# Patient Record
Sex: MALE | Race: WHITE | Employment: STUDENT | URBAN - NONMETROPOLITAN AREA
[De-identification: names, ages, dates, MRNs, and addresses within clinical notes are randomized per-mention and may not be internally consistent; named-entity substitution may affect disease eponyms.]

---
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
## 2023-08-03 LAB
ESTIMATED AVERAGE GLUCOSE FOR HBA1C: 103 MG/DL
HEMOGLOBIN A1C/HEMOGLOBIN TOTAL IN BLOOD: 5.2 %
KEPPRA: 16 UG/ML (ref 10–40)

## 2024-02-04 ENCOUNTER — APPOINTMENT (OUTPATIENT)
Dept: RADIOLOGY | Facility: HOSPITAL | Age: 24
End: 2024-02-04
Payer: COMMERCIAL

## 2024-02-04 ENCOUNTER — HOSPITAL ENCOUNTER (EMERGENCY)
Facility: HOSPITAL | Age: 24
Discharge: HOME | End: 2024-02-04
Payer: COMMERCIAL

## 2024-02-04 VITALS
BODY MASS INDEX: 26.78 KG/M2 | DIASTOLIC BLOOD PRESSURE: 46 MMHG | HEART RATE: 55 BPM | OXYGEN SATURATION: 99 % | TEMPERATURE: 97.9 F | SYSTOLIC BLOOD PRESSURE: 108 MMHG | WEIGHT: 180.78 LBS | RESPIRATION RATE: 17 BRPM | HEIGHT: 69 IN

## 2024-02-04 DIAGNOSIS — M54.50 ACUTE BILATERAL LOW BACK PAIN WITHOUT SCIATICA: Primary | ICD-10-CM

## 2024-02-04 PROCEDURE — 72110 X-RAY EXAM L-2 SPINE 4/>VWS: CPT | Performed by: RADIOLOGY

## 2024-02-04 PROCEDURE — 2500000004 HC RX 250 GENERAL PHARMACY W/ HCPCS (ALT 636 FOR OP/ED): Performed by: PHYSICIAN ASSISTANT

## 2024-02-04 PROCEDURE — 96372 THER/PROPH/DIAG INJ SC/IM: CPT

## 2024-02-04 PROCEDURE — 99284 EMERGENCY DEPT VISIT MOD MDM: CPT | Mod: 25

## 2024-02-04 PROCEDURE — 99283 EMERGENCY DEPT VISIT LOW MDM: CPT

## 2024-02-04 PROCEDURE — 2500000005 HC RX 250 GENERAL PHARMACY W/O HCPCS: Performed by: PHYSICIAN ASSISTANT

## 2024-02-04 PROCEDURE — 72110 X-RAY EXAM L-2 SPINE 4/>VWS: CPT

## 2024-02-04 RX ORDER — LIDOCAINE 560 MG/1
1 PATCH PERCUTANEOUS; TOPICAL; TRANSDERMAL DAILY
Qty: 5 PATCH | Refills: 0 | Status: SHIPPED | OUTPATIENT
Start: 2024-02-04 | End: 2024-02-09

## 2024-02-04 RX ORDER — ORPHENADRINE CITRATE 30 MG/ML
60 INJECTION INTRAMUSCULAR; INTRAVENOUS ONCE
Status: COMPLETED | OUTPATIENT
Start: 2024-02-04 | End: 2024-02-04

## 2024-02-04 RX ORDER — KETOROLAC TROMETHAMINE 15 MG/ML
15 INJECTION, SOLUTION INTRAMUSCULAR; INTRAVENOUS ONCE
Status: COMPLETED | OUTPATIENT
Start: 2024-02-04 | End: 2024-02-04

## 2024-02-04 RX ORDER — LIDOCAINE 560 MG/1
1 PATCH PERCUTANEOUS; TOPICAL; TRANSDERMAL ONCE
Status: DISCONTINUED | OUTPATIENT
Start: 2024-02-04 | End: 2024-02-04 | Stop reason: HOSPADM

## 2024-02-04 RX ORDER — METHOCARBAMOL 500 MG/1
500 TABLET, FILM COATED ORAL 4 TIMES DAILY PRN
Qty: 30 TABLET | Refills: 0 | Status: SHIPPED | OUTPATIENT
Start: 2024-02-04

## 2024-02-04 RX ADMIN — LIDOCAINE 1 PATCH: 4 PATCH TOPICAL at 15:34

## 2024-02-04 RX ADMIN — ORPHENADRINE CITRATE 60 MG: 60 INJECTION INTRAMUSCULAR; INTRAVENOUS at 15:31

## 2024-02-04 RX ADMIN — KETOROLAC TROMETHAMINE 15 MG: 15 INJECTION, SOLUTION INTRAMUSCULAR; INTRAVENOUS at 15:30

## 2024-02-04 ASSESSMENT — PAIN DESCRIPTION - ORIENTATION
ORIENTATION: LOWER
ORIENTATION: MID

## 2024-02-04 ASSESSMENT — COLUMBIA-SUICIDE SEVERITY RATING SCALE - C-SSRS
2. HAVE YOU ACTUALLY HAD ANY THOUGHTS OF KILLING YOURSELF?: NO
6. HAVE YOU EVER DONE ANYTHING, STARTED TO DO ANYTHING, OR PREPARED TO DO ANYTHING TO END YOUR LIFE?: NO
1. IN THE PAST MONTH, HAVE YOU WISHED YOU WERE DEAD OR WISHED YOU COULD GO TO SLEEP AND NOT WAKE UP?: NO

## 2024-02-04 ASSESSMENT — PAIN DESCRIPTION - PAIN TYPE: TYPE: ACUTE PAIN

## 2024-02-04 ASSESSMENT — PAIN SCALES - GENERAL
PAINLEVEL_OUTOF10: 5 - MODERATE PAIN
PAINLEVEL_OUTOF10: 4
PAINLEVEL_OUTOF10: 10 - WORST POSSIBLE PAIN
PAINLEVEL_OUTOF10: 10 - WORST POSSIBLE PAIN

## 2024-02-04 ASSESSMENT — LIFESTYLE VARIABLES
EVER HAD A DRINK FIRST THING IN THE MORNING TO STEADY YOUR NERVES TO GET RID OF A HANGOVER: NO
HAVE PEOPLE ANNOYED YOU BY CRITICIZING YOUR DRINKING: NO
HAVE YOU EVER FELT YOU SHOULD CUT DOWN ON YOUR DRINKING: NO
EVER FELT BAD OR GUILTY ABOUT YOUR DRINKING: NO

## 2024-02-04 ASSESSMENT — PAIN DESCRIPTION - LOCATION
LOCATION: BACK

## 2024-02-04 ASSESSMENT — PAIN - FUNCTIONAL ASSESSMENT
PAIN_FUNCTIONAL_ASSESSMENT: 0-10

## 2024-02-04 NOTE — ED PROVIDER NOTES
HPI   Chief Complaint   Patient presents with    Back Pain     Pt c/o lower back pain       This is a 23-year-old male who denies significant PMH presenting for evaluation of atraumatic bilateral low back and hip pain since yesterday when he was bending over to  a dog that weighed approximately 5 pounds.  Pain is worse with changing position and ambulation. Denies history of chronic back pain. Denies history of back surgery. Denies anticoagulation. Denies falls, trauma, or heavy exertion. Denies saddle anesthesia. Denies bowel or bladder changes. Denies fevers, chills, history of IV drug use, history of malignancy. Denies weakness, numbness, tingling, loss of sensation. Denies chest pain, shortness of breath, abdominal pain, urinary symptoms.       History provided by:  Patient   used: No                        Flanagan Coma Scale Score: 15                  Patient History   No past medical history on file.  No past surgical history on file.  No family history on file.  Social History     Tobacco Use    Smoking status: Not on file    Smokeless tobacco: Not on file   Substance Use Topics    Alcohol use: Not on file    Drug use: Not on file       Physical Exam   ED Triage Vitals   Temperature Heart Rate Respirations BP   02/04/24 1458 02/04/24 1458 02/04/24 1458 02/04/24 1458   36.6 °C (97.9 °F) 70 16 140/59      Pulse Ox Temp Source Heart Rate Source Patient Position   02/04/24 1458 02/04/24 1653 02/04/24 1653 02/04/24 1653   98 % Temporal Monitor Sitting      BP Location FiO2 (%)     02/04/24 1653 --     Right arm        Physical Exam    Gen.: Vitals noted. No apparent distress. Afebrile.  Heart: Regular rate and rhythm. No murmur.  Lungs: Clear to auscultation bilaterally with good aeration and no adventitious breath sounds.   Abdomen: Soft, nontender   Back: There is tenderness to palpation in the lumbar paraspinal musculature bilaterally and midline.   Extremities: Normal motor sensory,  strong equal peripheral pulses Neurovascularly intact throughout.  Skin: No rash on back  Neuro: No focal neurologic deficits    ED Course & MDM   Diagnoses as of 02/04/24 1825   Acute bilateral low back pain without sciatica       Medical Decision Making  Patient is stable hemodynamically and is non-toxic appearing and in no apparent distress. Neurovascularly intact on exam. Patient has no red flag neurologic signs or symptoms either in history or on physical exam. Low suspicion for cauda equina/ cord compression at this time.  Basic plain film x-rays were obtained and showed no acute fracture or subluxation as read by the radiologist.  He was given IM Toradol IM Norflex topical lidocaine patch with improvement in his pain symptoms.  I suspect paraspinal muscle spasm/strain for which the patient will be prescribed Robaxin and lidocaine patches for home use and advised OTC analgesics/anti-inflammatories as needed.  Instructed to return to the nearest ED if any concerns or new or worsening symptoms. Patient verbalized understanding and agreement with plan. Discharged in stable condition.      Disclaimer: This note was dictated using speech recognition software. An attempt at proofreading was made to minimize errors. Minor errors in transcription may be present. Please call if questions.    Amount and/or Complexity of Data Reviewed  Radiology: ordered.        Procedure  Procedures     Micheal Harmon PA-C  02/04/24 1820

## 2024-02-04 NOTE — ED NOTES
Pt came into the ED today due to he lifted his 5 lb dog yesterday and has been in pain ever since.  He states his pain is 5/10 bi-lateral lower back  wife is bedside     Gina Stewart RN  02/04/24 1619

## 2024-02-04 NOTE — Clinical Note
Peter Jarrell was seen and treated in our emergency department on 2/4/2024.  He may return to work on 02/08/2024.  Avoid lifting more than 10 or 15 pounds and bending at the waist or squatting for the next 1 week or until feeling you can tolerate this activity     If you have any questions or concerns, please don't hesitate to call.      Micheal Harmon PA-C

## 2024-02-23 ENCOUNTER — APPOINTMENT (OUTPATIENT)
Dept: ORTHOPEDIC SURGERY | Facility: CLINIC | Age: 24
End: 2024-02-23
Payer: COMMERCIAL

## 2024-03-08 ENCOUNTER — APPOINTMENT (OUTPATIENT)
Dept: ORTHOPEDIC SURGERY | Facility: CLINIC | Age: 24
End: 2024-03-08
Payer: COMMERCIAL

## 2024-05-31 DIAGNOSIS — G40.909 EPILEPSY, UNSPECIFIED, NOT INTRACTABLE, WITHOUT STATUS EPILEPTICUS (MULTI): ICD-10-CM

## 2024-06-03 RX ORDER — LEVETIRACETAM 500 MG/1
500 TABLET ORAL 2 TIMES DAILY
Qty: 180 TABLET | Refills: 3 | Status: SHIPPED | OUTPATIENT
Start: 2024-06-03

## 2024-08-02 DIAGNOSIS — G40.909 EPILEPSY, UNSPECIFIED, NOT INTRACTABLE, WITHOUT STATUS EPILEPTICUS (MULTI): ICD-10-CM

## 2024-08-06 RX ORDER — LEVETIRACETAM 1000 MG/1
1000 TABLET ORAL 2 TIMES DAILY
Qty: 180 TABLET | Refills: 3 | Status: SHIPPED | OUTPATIENT
Start: 2024-08-06

## 2024-08-27 DIAGNOSIS — G40.909 EPILEPSY, UNSPECIFIED, NOT INTRACTABLE, WITHOUT STATUS EPILEPTICUS (MULTI): ICD-10-CM

## 2024-08-27 NOTE — TELEPHONE ENCOUNTER
Pt needs refill of levetiracetam 1,000 mg tablet, Take 1 tablet po BID and levetiracetam 500 mg tablet, Take 1 tablet po BID sent to [facility]. Please note pt made October appt to see Dr. Sales (been over a year)

## 2024-09-03 RX ORDER — LEVETIRACETAM 1000 MG/1
1000 TABLET ORAL 2 TIMES DAILY
Qty: 180 TABLET | Refills: 3 | Status: SHIPPED | OUTPATIENT
Start: 2024-09-03

## 2024-09-03 RX ORDER — LEVETIRACETAM 500 MG/1
500 TABLET ORAL 2 TIMES DAILY
Qty: 180 TABLET | Refills: 3 | Status: SHIPPED | OUTPATIENT
Start: 2024-09-03

## 2024-10-02 ENCOUNTER — APPOINTMENT (OUTPATIENT)
Dept: NEUROLOGY | Facility: CLINIC | Age: 24
End: 2024-10-02
Payer: COMMERCIAL

## 2024-10-14 ENCOUNTER — APPOINTMENT (OUTPATIENT)
Dept: NEUROLOGY | Facility: CLINIC | Age: 24
End: 2024-10-14
Payer: COMMERCIAL

## 2024-10-14 ENCOUNTER — LAB (OUTPATIENT)
Dept: LAB | Facility: LAB | Age: 24
End: 2024-10-14
Payer: COMMERCIAL

## 2024-10-14 VITALS
HEART RATE: 53 BPM | BODY MASS INDEX: 21.48 KG/M2 | WEIGHT: 145 LBS | DIASTOLIC BLOOD PRESSURE: 60 MMHG | SYSTOLIC BLOOD PRESSURE: 98 MMHG | HEIGHT: 69 IN

## 2024-10-14 DIAGNOSIS — R63.4 WEIGHT LOSS: ICD-10-CM

## 2024-10-14 DIAGNOSIS — G40.909 NONINTRACTABLE EPILEPSY WITHOUT STATUS EPILEPTICUS, UNSPECIFIED EPILEPSY TYPE (MULTI): ICD-10-CM

## 2024-10-14 DIAGNOSIS — Z87.898 HISTORY OF NEOPLASM: ICD-10-CM

## 2024-10-14 DIAGNOSIS — G43.701 CHRONIC MIGRAINE WITHOUT AURA WITH STATUS MIGRAINOSUS, NOT INTRACTABLE: ICD-10-CM

## 2024-10-14 DIAGNOSIS — G40.909 NONINTRACTABLE EPILEPSY WITHOUT STATUS EPILEPTICUS, UNSPECIFIED EPILEPSY TYPE (MULTI): Primary | ICD-10-CM

## 2024-10-14 LAB — LEVETIRACETAM SERPL-MCNC: 42 UG/ML (ref 10–40)

## 2024-10-14 PROCEDURE — 3008F BODY MASS INDEX DOCD: CPT | Performed by: PSYCHIATRY & NEUROLOGY

## 2024-10-14 PROCEDURE — 36415 COLL VENOUS BLD VENIPUNCTURE: CPT

## 2024-10-14 PROCEDURE — 99214 OFFICE O/P EST MOD 30 MIN: CPT | Performed by: PSYCHIATRY & NEUROLOGY

## 2024-10-14 PROCEDURE — 80177 DRUG SCRN QUAN LEVETIRACETAM: CPT

## 2024-10-14 RX ORDER — SUMATRIPTAN 50 MG/1
50 TABLET, FILM COATED ORAL ONCE AS NEEDED
Qty: 9 TABLET | Refills: 1 | Status: SHIPPED | OUTPATIENT
Start: 2024-10-14

## 2024-10-14 ASSESSMENT — ENCOUNTER SYMPTOMS
APPETITE CHANGE: 1
HEADACHES: 1
UNEXPECTED WEIGHT CHANGE: 1
WEAKNESS: 1
FATIGUE: 1
PHOTOPHOBIA: 1
DIZZINESS: 1
LIGHT-HEADEDNESS: 1
APNEA: 1

## 2024-10-14 NOTE — PATIENT INSTRUCTIONS
Thank you for your visit today. As we discussed during this visit your seizures are well controlled by Keppra. If you need refills please contact the office. A blood level for keppra was ordered to check your levels and we will adjust your dose as needed. For your migraines we prescribed imitrex to take as needed when you have a migraine. 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 If you have any questions please do not hesitate to call the office.

## 2024-10-14 NOTE — PROGRESS NOTES
I saw and evaluated the patient. I personally obtained the key and critical portions of the history and physical exam or was physically present for key and critical portions performed by the resident/fellow. I reviewed the resident/fellow's documentation and discussed the patient with the resident/fellow. I agree with the resident/fellow's medical decision making as documented in the note.    Haider Sales MD

## 2024-10-14 NOTE — PROGRESS NOTES
"Chief Complaint Peter Jarrell is a 24 y.o. male who presents for seizure follow up    HPI:  Pt states that  he has not had any seizures since 2022. Endorses a significant amount of unintentional weight loss since last year and was wondering if it could be a side effect from Keppra. Patient is also complaining migraines that start at the temples and behind the eyes.  Patient also complains of episodes of dizziness along with feeling shaky and weak.    ROS:  Review of Systems   Constitutional:  Positive for appetite change, fatigue and unexpected weight change.   Eyes:  Positive for photophobia. Negative for visual disturbance.        Phonophobia   Respiratory:  Positive for apnea.    Neurological:  Positive for dizziness, weakness, light-headedness and headaches. Negative for syncope.       Meds:    Current Outpatient Medications:     levETIRAcetam (Keppra) 1,000 mg tablet, Take 1 tablet (1,000 mg) by mouth 2 times a day., Disp: 180 tablet, Rfl: 3    levETIRAcetam (Keppra) 500 mg tablet, Take 1 tablet (500 mg) by mouth 2 times a day., Disp: 180 tablet, Rfl: 3    methocarbamol (Robaxin) 500 mg tablet, Take 1 tablet (500 mg) by mouth 4 times a day as needed (low back pain). As needed for pain, Disp: 30 tablet, Rfl: 0    SUMAtriptan (Imitrex) 50 mg tablet, Take 1 tablet (50 mg) by mouth 1 time if needed for migraine (may repeat x1) for up to 18 doses. May repeat dose once in 2 hours if no relief.  Do not exceed 2 doses in 24 hours., Disp: 9 tablet, Rfl: 1    Allergies:  No Known Allergies    PE:  Visit Vitals  BP 98/60 (BP Location: Right arm, Patient Position: Sitting, BP Cuff Size: Large adult)   Pulse 53   Ht 1.753 m (5' 9\")   Wt 65.8 kg (145 lb)   BMI 21.41 kg/m²   Smoking Status Every Day   BSA 1.79 m²           Assessment/Plan  Peter Jarrell is a 24 y.o. male who presents for follow up for seizures. Will continue current Keppra dose until Keppra level is back. Due to history of subependymoma and new headaches with " dizziness, an order for MRI was placed during this visit. For the migraine, a prescription for Imitrex was sent to the preferred pharmacy. Will follow up with MRI results.    Peter was seen today for seizures.  Diagnoses and all orders for this visit:  Nonintractable epilepsy without status epilepticus, unspecified epilepsy type (Multi) (Primary)  -     Levetiracetam; Future  Chronic migraine without aura with status migrainosus, not intractable  -     SUMAtriptan (Imitrex) 50 mg tablet; Take 1 tablet (50 mg) by mouth 1 time if needed for migraine (may repeat x1) for up to 18 doses. May repeat dose once in 2 hours if no relief.  Do not exceed 2 doses in 24 hours.  Weight loss  -     MR brain w and wo IV contrast; Future  History of neoplasm         Follow up with MRI results.      Patient was staffed with Dr. Henrry Rose DO, PGY-3  [facility]

## 2024-10-15 NOTE — RESULT ENCOUNTER NOTE
Please tell the patient/mother that his Keppra level was 42- this is fine, just above the upper limit of normal.

## 2024-11-08 ENCOUNTER — APPOINTMENT (OUTPATIENT)
Dept: RADIOLOGY | Facility: HOSPITAL | Age: 24
End: 2024-11-08

## 2024-11-17 ENCOUNTER — APPOINTMENT (OUTPATIENT)
Dept: RADIOLOGY | Facility: HOSPITAL | Age: 24
End: 2024-11-17

## 2025-01-25 ENCOUNTER — APPOINTMENT (OUTPATIENT)
Dept: RADIOLOGY | Facility: HOSPITAL | Age: 25
End: 2025-01-25

## 2025-02-28 ENCOUNTER — TELEPHONE (OUTPATIENT)
Dept: NEUROLOGY | Facility: CLINIC | Age: 25
End: 2025-02-28

## 2025-02-28 NOTE — TELEPHONE ENCOUNTER
Patient needs the forms to be refaxed- it is not scanned into chart so I could not do this for you

## 2025-04-04 ENCOUNTER — APPOINTMENT (OUTPATIENT)
Dept: NEUROLOGY | Facility: CLINIC | Age: 25
End: 2025-04-04
Payer: COMMERCIAL

## 2025-05-18 ENCOUNTER — HOSPITAL ENCOUNTER (EMERGENCY)
Facility: HOSPITAL | Age: 25
Discharge: HOME | End: 2025-05-18
Attending: STUDENT IN AN ORGANIZED HEALTH CARE EDUCATION/TRAINING PROGRAM

## 2025-05-18 VITALS
BODY MASS INDEX: 21.48 KG/M2 | WEIGHT: 145 LBS | HEART RATE: 60 BPM | DIASTOLIC BLOOD PRESSURE: 63 MMHG | OXYGEN SATURATION: 98 % | TEMPERATURE: 98.2 F | HEIGHT: 69 IN | SYSTOLIC BLOOD PRESSURE: 105 MMHG | RESPIRATION RATE: 15 BRPM

## 2025-05-18 DIAGNOSIS — S05.01XA ABRASION OF RIGHT CORNEA, INITIAL ENCOUNTER: Primary | ICD-10-CM

## 2025-05-18 PROCEDURE — 99283 EMERGENCY DEPT VISIT LOW MDM: CPT | Performed by: STUDENT IN AN ORGANIZED HEALTH CARE EDUCATION/TRAINING PROGRAM

## 2025-05-18 PROCEDURE — 2500000005 HC RX 250 GENERAL PHARMACY W/O HCPCS: Performed by: STUDENT IN AN ORGANIZED HEALTH CARE EDUCATION/TRAINING PROGRAM

## 2025-05-18 PROCEDURE — 2500000001 HC RX 250 WO HCPCS SELF ADMINISTERED DRUGS (ALT 637 FOR MEDICARE OP): Performed by: STUDENT IN AN ORGANIZED HEALTH CARE EDUCATION/TRAINING PROGRAM

## 2025-05-18 RX ORDER — POLYMYXIN B SULFATE AND TRIMETHOPRIM 1; 10000 MG/ML; [USP'U]/ML
1 SOLUTION OPHTHALMIC
Status: DISCONTINUED | OUTPATIENT
Start: 2025-05-18 | End: 2025-05-18 | Stop reason: HOSPADM

## 2025-05-18 RX ORDER — TETRACAINE HYDROCHLORIDE 5 MG/ML
1 SOLUTION OPHTHALMIC ONCE
Status: COMPLETED | OUTPATIENT
Start: 2025-05-18 | End: 2025-05-18

## 2025-05-18 RX ADMIN — POLYMYXIN B SULFATE AND TRIMETHOPRIM 1 DROP: 10000; 1 SOLUTION OPHTHALMIC at 03:22

## 2025-05-18 RX ADMIN — TETRACAINE HYDROCHLORIDE 1 DROP: 5 SOLUTION OPHTHALMIC at 03:18

## 2025-05-18 RX ADMIN — FLUORESCEIN SODIUM 1 STRIP: 1 STRIP OPHTHALMIC at 03:19

## 2025-05-18 ASSESSMENT — VISUAL ACUITY
OU: 20/10
OD: 20/13
OS: 20/15

## 2025-05-18 ASSESSMENT — PAIN SCALES - GENERAL
PAINLEVEL_OUTOF10: 8
PAINLEVEL_OUTOF10: 0 - NO PAIN

## 2025-05-18 ASSESSMENT — COLUMBIA-SUICIDE SEVERITY RATING SCALE - C-SSRS
1. IN THE PAST MONTH, HAVE YOU WISHED YOU WERE DEAD OR WISHED YOU COULD GO TO SLEEP AND NOT WAKE UP?: NO
2. HAVE YOU ACTUALLY HAD ANY THOUGHTS OF KILLING YOURSELF?: NO
6. HAVE YOU EVER DONE ANYTHING, STARTED TO DO ANYTHING, OR PREPARED TO DO ANYTHING TO END YOUR LIFE?: NO

## 2025-05-18 ASSESSMENT — PAIN - FUNCTIONAL ASSESSMENT: PAIN_FUNCTIONAL_ASSESSMENT: 0-10

## 2025-05-18 ASSESSMENT — LIFESTYLE VARIABLES
EVER FELT BAD OR GUILTY ABOUT YOUR DRINKING: NO
EVER HAD A DRINK FIRST THING IN THE MORNING TO STEADY YOUR NERVES TO GET RID OF A HANGOVER: NO
HAVE PEOPLE ANNOYED YOU BY CRITICIZING YOUR DRINKING: NO
TOTAL SCORE: 0
HAVE YOU EVER FELT YOU SHOULD CUT DOWN ON YOUR DRINKING: NO

## 2025-05-18 ASSESSMENT — PAIN DESCRIPTION - LOCATION: LOCATION: EYE

## 2025-05-18 NOTE — Clinical Note
Peter Jarrell was seen and treated in our emergency department on 5/18/2025.  He may return to work on 05/19/2025.       If you have any questions or concerns, please don't hesitate to call.      Veda Gamez, DO

## 2025-05-18 NOTE — ED PROVIDER NOTES
CC: Eye Pain (right)     HPI:  Patient is a 25-year-old male with a history of epilepsy who states on Thursday while driving home from work he felt like he got something in his eye.  He states he flushed his eye multiple times with saline and Visine.  However he still feels like something is scratching his eye.  He states occasionally when his eye friedman it becomes blurry although otherwise he has no visual abnormalities.    Records Reviewed:  Recent available ED and inpatient notes reviewed in EMR.    PMHx/PSHx:  Per HPI.   - has no past medical history on file.  - has no past surgical history on file.  - does not have a problem list on file.    Medications:  Reviewed in EMR. See EMR for complete list of medications and doses.    Allergies:  Patient has no known allergies.    Social History:  - Tobacco:  reports that he has been smoking cigarettes. He has never used smokeless tobacco.   - Alcohol:  reports no history of alcohol use.   - Illicit Drugs:  reports current drug use. Drug: Marijuana.     ROS:  Per HPI.       ???????????????????????????????????????????????????????????????  Triage Vitals:  T 36.8 °C (98.2 °F)  HR 60  /63  RR 15  O2 98 % None (Room air)    Physical Exam  ???????????????????????????????????????????????????????????????  GEN: well appearing, no acute distress  HEENT: See nursing documentation for visual acuity.  Is appropriate.  Extraocular muscles are intact.  Visual field intact.  Has full eye tetracaine and stained with fluorescein and there is a triangular abrasion noted in midline below the iris no foreign body appreciated.  CVS/CHEST: reg rate, nl rhythm  PULM: Non-labored breathing saturating appropriately on RA  NEURO: Awake and alert, moving all extremities equally and spontaneously, normal ambulation  SKIN: warm, dry    Assessment and Plan:  25-year-old presents the emergency department with foreign body sensation in the right eye.  He has a corneal abrasion.  He does not  wear contact lenses.  He was written for Polytrim drops and encouraged to take 1 drop every 4 hours for 7 days.  Patient expressed understanding.  Encouraged to return if his vision worsens.  Discussed anticipated clinical course with patient expressed understanding and was agreeable with the plan.    ED Course:  Diagnoses as of 05/18/25 0402   Abrasion of right cornea, initial encounter       Disposition:  Discharged in stable condition with return precautions    Veda Gamez DO      Procedures ? [employer] last updated 5/18/2025 4:02 AM        Veda Gamez DO  05/18/25 0414

## 2025-05-18 NOTE — DISCHARGE INSTRUCTIONS
Use 1 drop of Polytrim in affected eye every 4 hours x 7 days.  Return if you have worsening vision.

## 2025-06-13 ENCOUNTER — HOSPITAL ENCOUNTER (EMERGENCY)
Facility: HOSPITAL | Age: 25
Discharge: HOME | End: 2025-06-13

## 2025-06-13 VITALS
TEMPERATURE: 97.7 F | SYSTOLIC BLOOD PRESSURE: 131 MMHG | OXYGEN SATURATION: 99 % | BODY MASS INDEX: 21.48 KG/M2 | HEIGHT: 69 IN | RESPIRATION RATE: 16 BRPM | WEIGHT: 145 LBS | HEART RATE: 65 BPM | DIASTOLIC BLOOD PRESSURE: 63 MMHG

## 2025-06-13 DIAGNOSIS — L23.7 POISON IVY DERMATITIS: Primary | ICD-10-CM

## 2025-06-13 PROCEDURE — 99283 EMERGENCY DEPT VISIT LOW MDM: CPT

## 2025-06-13 RX ORDER — PREDNISONE 20 MG/1
TABLET ORAL
Qty: 24 TABLET | Refills: 0 | Status: SHIPPED | OUTPATIENT
Start: 2025-06-13

## 2025-06-13 ASSESSMENT — PAIN - FUNCTIONAL ASSESSMENT: PAIN_FUNCTIONAL_ASSESSMENT: 0-10

## 2025-06-13 ASSESSMENT — PAIN DESCRIPTION - LOCATION: LOCATION: ARM

## 2025-06-13 ASSESSMENT — LIFESTYLE VARIABLES
EVER FELT BAD OR GUILTY ABOUT YOUR DRINKING: NO
EVER HAD A DRINK FIRST THING IN THE MORNING TO STEADY YOUR NERVES TO GET RID OF A HANGOVER: NO
TOTAL SCORE: 0
HAVE YOU EVER FELT YOU SHOULD CUT DOWN ON YOUR DRINKING: NO
HAVE PEOPLE ANNOYED YOU BY CRITICIZING YOUR DRINKING: NO

## 2025-06-13 ASSESSMENT — PAIN SCALES - GENERAL: PAINLEVEL_OUTOF10: 2

## 2025-06-13 NOTE — ED PROVIDER NOTES
HPI   Chief Complaint   Patient presents with    Rash     Poison ivy x 1 week with no relief started on right elbow and is now on bilateral arms and legs        History of present illness:  25-year-old male presents emergency room for complaints of rash present on his arms.  The patient states began noticing the rash about 8 or 9 days ago.  He states he was out kayaking [place].  He states that when he was on there he came across some foliage and he believes the mice been poison ivy.  He states the rash is only present on his arms began developing about 2 days afterwards.  He has been putting cortisone 10 on the rash which has been helping tremendously but is not fully resolved.  He states is not spreading or else on his body he has no other symptoms including any fevers or chills.  He denies any other symptoms at this time.    Social history: Negative for alcohol and drug use.    Review of systems:   Gen.: No weight loss, fatigue, anorexia, insomnia, fever.   Eyes: No vision loss, double vision, drainage, eye pain.   ENT: No pharyngitis, dry mouth.   Cardiac: No chest pain, palpitations, syncope, near syncope.   Pulmonary: No shortness of breath, cough, hemoptysis.   Heme/lymph: No swollen glands, fever, bleeding.   GI: No abdominal pain, change in bowel habits, melena, hematemesis, hematochezia, nausea, vomiting, diarrhea.   : No discharge, dysuria, frequency, urgency, hematuria.   Musculoskeletal: No limb pain, joint pain, joint swelling.   Skin: confirms rash  Review of systems is otherwise negative unless stated above or in history of present illness.        Physical exam:  General: Vitals noted, no distress. Afebrile.   EENT: No lymphadenopathy appreciated  Cardiac: Regular, rate, rhythm, no murmur.   Pulmonary: Lungs clear bilaterally with good aeration. No adventitious breath sounds.   Abdomen: Soft, nonsurgical. Nontender. No peritoneal signs. Normoactive bowel  sounds.   Extremities: No peripheral edema.   Skin: Poison ivy dermatitis present on the patient's arms on either side, no secondary cellulitis present  Neuro: No focal neurologic deficits      Medical decision making:   Testing: Clinical exam  Plan: Home-going.  Discussed differential. Will follow-up with the primary physician in the next 2-3 days. Return if worse. They understand return precautions and discharge instructions. Patient and family/friend/caregiver are in agreement with this plan. 25-year-old male presents emergency room for complaints of rash present on his arms.  The patient states began noticing the rash about 8 or 9 days ago.  He states he was out kayaking [place].  He states that when he was on there he came across some foliage and he believes the mice been poison ivy.  He states the rash is only present on his arms began developing about 2 days afterwards.  He has been putting cortisone 10 on the rash which has been helping tremendously but is not fully resolved.  He states is not spreading or else on his body he has no other symptoms including any fevers or chills.  He denies any other symptoms at this time. Skin: Poison ivy dermatitis present on the patient's arms on either side, no secondary cellulitis present.  I explained to the patient be sending him a short course of steroids at this time.  He was in agreement this plan and will follow-up with his primary care doctor in outpatient setting.  Impression:   1.  Poison ivy dermatitis            History provided by:  Parent   used: No            Patient History   Medical History[1]  Surgical History[2]  Family History[3]  Social History[4]    Physical Exam   ED Triage Vitals [06/13/25 1010]   Temperature Heart Rate Respirations BP   36.5 °C (97.7 °F) 65 16 131/63      Pulse Ox Temp Source Heart Rate Source Patient Position   99 % Skin Monitor Sitting      BP Location FiO2 (%)      Left arm --       Physical Exam      ED Course & MDM   Diagnoses as of 06/13/25 1031   Poison ivy dermatitis                 No data recorded     Brit Coma Scale Score: 15 (06/13/25 1011 : Milton Campo, RN)                           Medical Decision Making      Procedure  Procedures       [1] History reviewed. No pertinent past medical history.  [2] History reviewed. No pertinent surgical history.  [3] No family history on file.  [4]   Social History  Tobacco Use    Smoking status: Every Day     Current packs/day: 1.00     Types: Cigarettes    Smokeless tobacco: Never   Substance Use Topics    Alcohol use: Yes     Alcohol/week: 22.0 standard drinks of alcohol     Types: 22 Cans of beer per week    Drug use: Yes     Types: Marijuana        Ralph Ulloa PA-C  06/13/25 1034

## 2025-06-13 NOTE — ED TRIAGE NOTES
Patient here for rash. Poison ivy x 1 week with no relief started on right elbow and is now on bilateral arms and legs

## 2025-08-22 DIAGNOSIS — G40.909 EPILEPSY, UNSPECIFIED, NOT INTRACTABLE, WITHOUT STATUS EPILEPTICUS: ICD-10-CM

## 2025-08-22 RX ORDER — LEVETIRACETAM 1000 MG/1
1000 TABLET ORAL 2 TIMES DAILY
Qty: 180 TABLET | Refills: 0 | Status: SHIPPED | OUTPATIENT
Start: 2025-08-22

## 2025-08-22 RX ORDER — LEVETIRACETAM 500 MG/1
500 TABLET ORAL 2 TIMES DAILY
Qty: 180 TABLET | Refills: 0 | Status: SHIPPED | OUTPATIENT
Start: 2025-08-22

## 2025-11-11 ENCOUNTER — APPOINTMENT (OUTPATIENT)
Dept: NEUROLOGY | Facility: CLINIC | Age: 25
End: 2025-11-11